# Patient Record
Sex: FEMALE | Race: WHITE
[De-identification: names, ages, dates, MRNs, and addresses within clinical notes are randomized per-mention and may not be internally consistent; named-entity substitution may affect disease eponyms.]

---

## 2018-02-09 ENCOUNTER — HOSPITAL ENCOUNTER (EMERGENCY)
Dept: HOSPITAL 17 - NEPD | Age: 53
Discharge: HOME | End: 2018-02-09
Payer: SELF-PAY

## 2018-02-09 VITALS
RESPIRATION RATE: 16 BRPM | TEMPERATURE: 98.8 F | SYSTOLIC BLOOD PRESSURE: 171 MMHG | OXYGEN SATURATION: 97 % | HEART RATE: 102 BPM | DIASTOLIC BLOOD PRESSURE: 112 MMHG

## 2018-02-09 VITALS
HEART RATE: 98 BPM | OXYGEN SATURATION: 97 % | DIASTOLIC BLOOD PRESSURE: 91 MMHG | TEMPERATURE: 98 F | RESPIRATION RATE: 16 BRPM | SYSTOLIC BLOOD PRESSURE: 173 MMHG

## 2018-02-09 VITALS — WEIGHT: 136.69 LBS | HEIGHT: 70 IN | BODY MASS INDEX: 19.57 KG/M2

## 2018-02-09 DIAGNOSIS — F32.9: ICD-10-CM

## 2018-02-09 DIAGNOSIS — R60.9: ICD-10-CM

## 2018-02-09 DIAGNOSIS — J32.0: Primary | ICD-10-CM

## 2018-02-09 DIAGNOSIS — R51: ICD-10-CM

## 2018-02-09 DIAGNOSIS — Z88.0: ICD-10-CM

## 2018-02-09 DIAGNOSIS — F17.200: ICD-10-CM

## 2018-02-09 PROCEDURE — 99283 EMERGENCY DEPT VISIT LOW MDM: CPT

## 2018-02-09 NOTE — PD
HPI


Chief Complaint:  Oral / Dental Pain or Problem


Time Seen by Provider:  19:26


Travel History


International Travel<30 days:  No


Contact w/Intl Traveler<30days:  No


Traveled to known affect area:  No





History of Present Illness


HPI


Patient is a 52 year old female presenting for evaluation of sinus pressure, 

nasal congestion, rhinorrhea, facial swelling that has been ongoing for the 

last 3 days. Pt reports a hx of sinusitis. She is not currently taking any 

medications. She has not taken any medications to alleviate the pain. Symptoms 

are worse when laying down. She reports the pain is pressure like and a 5/10. 

She has no alleviating factors, onset was gradual over the last few days. She 

denies any fevers, chills, nausea, vomiting. She reports a very slight frontal 

headache.  There is no photophobia, phonophobia, visual changes.  She denies 

any dental pain.  Patient reports a history of white coat syndrome.





PFSH


Past Medical History


Depression:  Yes


Cardiac Catheterization:  Yes


Diminished Hearing:  No


Medical other:  Yes (insomnia)


Immunizations Current:  Yes


Tetanus Vaccination:  Unknown


Influenza Vaccination:  No


Pregnant?:  Not Pregnant





Social History


Alcohol Use:  Yes (RARELY)


Tobacco Use:  Yes (1/2 PPD)


Substance Use:  No





Allergies-Medications


(Allergen,Severity, Reaction):  


Coded Allergies:  


     penicillin G (Unverified  Allergy, Severe, UNKNOWN, 2/9/18)


Reported Meds & Prescriptions





Reported Meds & Active Scripts


Active








Review of Systems


Except as stated in HPI:  all other systems reviewed are Neg


HENT:  Positive: Headaches, Other (Right maxillary sinus pressure)


Musculoskeletal:  Positive: Edema





Physical Exam


Narrative


GENERAL: Well-developed, well-nourished, alert  female.  Presenting in 

no acute distress.


SKIN: Warm and dry.  Edema to the right cheek, no significant erythema.  

Tenderness to palpation over right maxillary sinus.


MOUTH: Mucous membranes moist, no lesions, tongue and gums appear normal.  

Missing dentition


HEAD: Normocephalic.


EYES: No scleral icterus. No injection or drainage. 


NECK: Supple, trachea midline. No JVD or lymphadenopathy.


CARDIOVASCULAR: Regular rate and rhythm without murmurs, gallops, or rubs. 


RESPIRATORY: Breath sounds equal bilaterally. No accessory muscle use.


GASTROINTESTINAL: Abdomen soft, non-tender, nondistended. 


MUSCULOSKELETAL: No cyanosis, or edema. 


BACK: Nontender without obvious deformity. No CVA tenderness.








Data


Data


Last Documented VS





Vital Signs








  Date Time  Temp Pulse Resp B/P (MAP) Pulse Ox O2 Delivery O2 Flow Rate FiO2


 


2/9/18 16:00 98.8 102 16 171/112 (131) 97   











[facility]


Medical Decision Making


Medical Screen Exam Complete:  Yes


Emergency Medical Condition:  Yes


Interpretation(s)





Vital Signs








  Date Time  Temp Pulse Resp B/P (MAP) Pulse Ox O2 Delivery O2 Flow Rate FiO2


 


2/9/18 16:00 98.8 102 16 171/112 (131) 97   








Differential Diagnosis


Sinusitis versus cellulitis versus dental abscess versus other


Narrative Course


52-year-old female presenting with 3 days of increasing right maxillary sinus 

pain, pressure and mild edema to the right cheek.  There is no significant 

erythema.  Patient's vital signs are stable, she is slightly hypertensive on 

arrival however she states she checks her blood pressure at home is always 

normal.  She reports a history of whitecoat syndrome.  Physical examination 

appears consistent with sinusitis.  Patient will be started on antibiotics now.

  She is encouraged to complete the course of antibiotics, she was encouraged 

to follow-up with her primary doctor or return to emergency department for any 

new worsening symptoms.  Patient verbalized understanding of instructions.  

Patient stable for discharge.





Diagnosis





 Primary Impression:  


 Maxillary sinusitis


 Qualified Codes:  J32.0 - Chronic maxillary sinusitis


Referrals:  


Primary Care Physician


1 week


Patient Instructions:  General Instructions, Sinusitis (ED)





***Additional Instructions:  


Follow-up with your primary doctor


Complete full course of antibiotics as prescribed


Use nasal spray as directed


Return to the emergency department for any new or worsening symptoms


***Med/Other Pt SpecificInfo:  Prescription(s) given


Scripts


Acetaminophen-Codeine (Tylenol-Codeine #3) 300-30 mg Tab


1 TAB PO Q6H Y for PAIN, #10 TAB 0 Refills


   Prov: Roseanne Carvajal         2/9/18 


Fluticasone Nasal Spray (Fluticasone Nasal Spray) 50 Mcg/Act Naspr


50 MCG EACH NARE BID for Allergy Management, #1 BOTTLE 0 Refills


   50 mcg/spray


   Prov: Roseanne Carvajal         2/9/18 


Doxycycline Hyclate (Doxycycline Hyclate) 100 Mg Cap


100 MG PO BID for Infection, #20 CAP 0 Refills


   Prov: Roseanne Carvajal         2/9/18


Disposition:  01 DISCHARGE HOME


Condition:  Stable











Roseanne Carvajal Feb 9, 2018 19:47